# Patient Record
Sex: MALE | Race: WHITE | NOT HISPANIC OR LATINO | URBAN - METROPOLITAN AREA
[De-identification: names, ages, dates, MRNs, and addresses within clinical notes are randomized per-mention and may not be internally consistent; named-entity substitution may affect disease eponyms.]

---

## 2019-02-01 ENCOUNTER — OUTPATIENT (OUTPATIENT)
Dept: OUTPATIENT SERVICES | Facility: HOSPITAL | Age: 19
LOS: 1 days | End: 2019-02-01
Payer: MEDICAID

## 2019-02-01 PROCEDURE — G9001: CPT

## 2019-02-13 ENCOUNTER — INPATIENT (INPATIENT)
Facility: HOSPITAL | Age: 19
LOS: 7 days | Discharge: HOME | End: 2019-02-21
Attending: PSYCHIATRY & NEUROLOGY | Admitting: PSYCHIATRY & NEUROLOGY
Payer: MEDICAID

## 2019-02-13 VITALS — TEMPERATURE: 98 F | HEIGHT: 68 IN | WEIGHT: 171.96 LBS

## 2019-02-13 DIAGNOSIS — F20.9 SCHIZOPHRENIA, UNSPECIFIED: ICD-10-CM

## 2019-02-14 DIAGNOSIS — Z71.89 OTHER SPECIFIED COUNSELING: ICD-10-CM

## 2019-02-14 LAB
ALBUMIN SERPL ELPH-MCNC: 4.9 G/DL — SIGNIFICANT CHANGE UP (ref 3.3–5)
ALP SERPL-CCNC: 104 U/L — SIGNIFICANT CHANGE UP (ref 60–270)
ALT FLD-CCNC: 33 U/L — SIGNIFICANT CHANGE UP (ref 4–41)
AMPHET UR-MCNC: NEGATIVE — SIGNIFICANT CHANGE UP
ANION GAP SERPL CALC-SCNC: 22 MMO/L — HIGH (ref 7–14)
APPEARANCE UR: CLEAR — SIGNIFICANT CHANGE UP
AST SERPL-CCNC: 31 U/L — SIGNIFICANT CHANGE UP (ref 4–40)
BARBITURATES UR SCN-MCNC: NEGATIVE — SIGNIFICANT CHANGE UP
BASOPHILS # BLD AUTO: 0.03 K/UL — SIGNIFICANT CHANGE UP (ref 0–0.2)
BASOPHILS NFR BLD AUTO: 0.3 % — SIGNIFICANT CHANGE UP (ref 0–2)
BENZODIAZ UR-MCNC: NEGATIVE — SIGNIFICANT CHANGE UP
BILIRUB SERPL-MCNC: 0.6 MG/DL — SIGNIFICANT CHANGE UP (ref 0.2–1.2)
BILIRUB UR-MCNC: NEGATIVE — SIGNIFICANT CHANGE UP
BLOOD UR QL VISUAL: NEGATIVE — SIGNIFICANT CHANGE UP
BUN SERPL-MCNC: 10 MG/DL — SIGNIFICANT CHANGE UP (ref 7–23)
CALCIUM SERPL-MCNC: 9.4 MG/DL — SIGNIFICANT CHANGE UP (ref 8.4–10.5)
CANNABINOIDS UR-MCNC: POSITIVE — SIGNIFICANT CHANGE UP
CHLORIDE SERPL-SCNC: 98 MMOL/L — SIGNIFICANT CHANGE UP (ref 98–107)
CHOLEST SERPL-MCNC: 133 MG/DL — SIGNIFICANT CHANGE UP (ref 120–199)
CO2 SERPL-SCNC: 18 MMOL/L — LOW (ref 22–31)
COCAINE METAB.OTHER UR-MCNC: NEGATIVE — SIGNIFICANT CHANGE UP
COLOR SPEC: COLORLESS — SIGNIFICANT CHANGE UP
CREAT SERPL-MCNC: 0.79 MG/DL — SIGNIFICANT CHANGE UP (ref 0.5–1.3)
EOSINOPHIL # BLD AUTO: 0.23 K/UL — SIGNIFICANT CHANGE UP (ref 0–0.5)
EOSINOPHIL NFR BLD AUTO: 2.4 % — SIGNIFICANT CHANGE UP (ref 0–6)
GLUCOSE SERPL-MCNC: 59 MG/DL — LOW (ref 70–99)
GLUCOSE UR-MCNC: NEGATIVE — SIGNIFICANT CHANGE UP
HBA1C BLD-MCNC: 5 % — SIGNIFICANT CHANGE UP (ref 4–5.6)
HCT VFR BLD CALC: 46.4 % — SIGNIFICANT CHANGE UP (ref 39–50)
HDLC SERPL-MCNC: 34 MG/DL — LOW (ref 35–55)
HGB BLD-MCNC: 15.8 G/DL — SIGNIFICANT CHANGE UP (ref 13–17)
IMM GRANULOCYTES NFR BLD AUTO: 0.3 % — SIGNIFICANT CHANGE UP (ref 0–1.5)
KETONES UR-MCNC: SIGNIFICANT CHANGE UP
LEUKOCYTE ESTERASE UR-ACNC: NEGATIVE — SIGNIFICANT CHANGE UP
LIPID PNL WITH DIRECT LDL SERPL: 92 MG/DL — SIGNIFICANT CHANGE UP
LYMPHOCYTES # BLD AUTO: 2 K/UL — SIGNIFICANT CHANGE UP (ref 1–3.3)
LYMPHOCYTES # BLD AUTO: 20.8 % — SIGNIFICANT CHANGE UP (ref 13–44)
MCHC RBC-ENTMCNC: 28.8 PG — SIGNIFICANT CHANGE UP (ref 27–34)
MCHC RBC-ENTMCNC: 34.1 % — SIGNIFICANT CHANGE UP (ref 32–36)
MCV RBC AUTO: 84.7 FL — SIGNIFICANT CHANGE UP (ref 80–100)
METHADONE UR-MCNC: NEGATIVE — SIGNIFICANT CHANGE UP
MONOCYTES # BLD AUTO: 0.78 K/UL — SIGNIFICANT CHANGE UP (ref 0–0.9)
MONOCYTES NFR BLD AUTO: 8.1 % — SIGNIFICANT CHANGE UP (ref 2–14)
NEUTROPHILS # BLD AUTO: 6.54 K/UL — SIGNIFICANT CHANGE UP (ref 1.8–7.4)
NEUTROPHILS NFR BLD AUTO: 68.1 % — SIGNIFICANT CHANGE UP (ref 43–77)
NITRITE UR-MCNC: NEGATIVE — SIGNIFICANT CHANGE UP
NRBC # FLD: 0 K/UL — LOW (ref 25–125)
OPIATES UR-MCNC: NEGATIVE — SIGNIFICANT CHANGE UP
OXYCODONE UR-MCNC: NEGATIVE — SIGNIFICANT CHANGE UP
PCP UR-MCNC: NEGATIVE — SIGNIFICANT CHANGE UP
PH UR: 6.5 — SIGNIFICANT CHANGE UP (ref 5–8)
PLATELET # BLD AUTO: 275 K/UL — SIGNIFICANT CHANGE UP (ref 150–400)
PMV BLD: 9.5 FL — SIGNIFICANT CHANGE UP (ref 7–13)
POTASSIUM SERPL-MCNC: 3.6 MMOL/L — SIGNIFICANT CHANGE UP (ref 3.5–5.3)
POTASSIUM SERPL-SCNC: 3.6 MMOL/L — SIGNIFICANT CHANGE UP (ref 3.5–5.3)
PROT SERPL-MCNC: 7.3 G/DL — SIGNIFICANT CHANGE UP (ref 6–8.3)
PROT UR-MCNC: NEGATIVE — SIGNIFICANT CHANGE UP
RBC # BLD: 5.48 M/UL — SIGNIFICANT CHANGE UP (ref 4.2–5.8)
RBC # FLD: 11.9 % — SIGNIFICANT CHANGE UP (ref 10.3–14.5)
SODIUM SERPL-SCNC: 138 MMOL/L — SIGNIFICANT CHANGE UP (ref 135–145)
SP GR SPEC: 1 — SIGNIFICANT CHANGE UP (ref 1–1.04)
TRIGL SERPL-MCNC: 63 MG/DL — SIGNIFICANT CHANGE UP (ref 10–149)
TSH SERPL-MCNC: 2.92 UIU/ML — SIGNIFICANT CHANGE UP (ref 0.5–4.3)
UROBILINOGEN FLD QL: NORMAL — SIGNIFICANT CHANGE UP
WBC # BLD: 9.61 K/UL — SIGNIFICANT CHANGE UP (ref 3.8–10.5)
WBC # FLD AUTO: 9.61 K/UL — SIGNIFICANT CHANGE UP (ref 3.8–10.5)

## 2019-02-14 PROCEDURE — 99232 SBSQ HOSP IP/OBS MODERATE 35: CPT

## 2019-02-14 RX ORDER — NICOTINE POLACRILEX 2 MG
1 GUM BUCCAL DAILY
Qty: 0 | Refills: 0 | Status: DISCONTINUED | OUTPATIENT
Start: 2019-02-14 | End: 2019-02-21

## 2019-02-14 RX ORDER — ARIPIPRAZOLE 15 MG/1
5 TABLET ORAL DAILY
Qty: 0 | Refills: 0 | Status: DISCONTINUED | OUTPATIENT
Start: 2019-02-14 | End: 2019-02-15

## 2019-02-14 RX ORDER — ARIPIPRAZOLE 15 MG/1
5 TABLET ORAL ONCE
Qty: 0 | Refills: 0 | Status: COMPLETED | OUTPATIENT
Start: 2019-02-14 | End: 2019-02-14

## 2019-02-14 RX ORDER — ACETAMINOPHEN 500 MG
650 TABLET ORAL EVERY 6 HOURS
Qty: 0 | Refills: 0 | Status: DISCONTINUED | OUTPATIENT
Start: 2019-02-14 | End: 2019-02-21

## 2019-02-14 RX ORDER — NICOTINE POLACRILEX 2 MG
1 GUM BUCCAL
Qty: 0 | Refills: 0 | Status: DISCONTINUED | OUTPATIENT
Start: 2019-02-14 | End: 2019-02-21

## 2019-02-14 RX ORDER — NICOTINE POLACRILEX 2 MG
2 GUM BUCCAL
Qty: 0 | Refills: 0 | Status: DISCONTINUED | OUTPATIENT
Start: 2019-02-14 | End: 2019-02-21

## 2019-02-14 RX ADMIN — Medication 2 MILLIGRAM(S): at 01:55

## 2019-02-14 RX ADMIN — Medication 1 PATCH: at 08:00

## 2019-02-14 RX ADMIN — ARIPIPRAZOLE 5 MILLIGRAM(S): 15 TABLET ORAL at 11:37

## 2019-02-14 RX ADMIN — Medication 1 EACH: at 11:52

## 2019-02-14 RX ADMIN — Medication 2 MILLIGRAM(S): at 20:53

## 2019-02-14 RX ADMIN — Medication 2 MILLIGRAM(S): at 13:49

## 2019-02-14 RX ADMIN — Medication 650 MILLIGRAM(S): at 15:30

## 2019-02-14 RX ADMIN — Medication 650 MILLIGRAM(S): at 14:08

## 2019-02-14 RX ADMIN — Medication 1 EACH: at 19:27

## 2019-02-14 NOTE — CHART NOTE - NSCHARTNOTEFT_GEN_A_CORE
Screening Pending. Pt was asleep uncooperative with admission screenings. Will attempt again tonight.

## 2019-02-14 NOTE — CHART NOTE - NSCHARTNOTEFT_GEN_A_CORE
Screening Medical Evaluation  Patient Admitted from: [facility] admitting diagnosis: Schizophrenia    PAST MEDICAL & SURGICAL HISTORY:        Allergies    Omnicef (Hives)    Intolerances        Social History:     FAMILY HISTORY:      MEDICATIONS  (STANDING):  ARIPiprazole 5 milliGRAM(s) Oral daily  nicotine -  14 mG/24Hr(s) Patch 1 patch Transdermal daily    MEDICATIONS  (PRN):  acetaminophen   Tablet .. 650 milliGRAM(s) Oral every 6 hours PRN Moderate Pain (4 - 6)  LORazepam     Tablet 2 milliGRAM(s) Oral every 6 hours PRN Agitation  LORazepam   Injectable 2 milliGRAM(s) IntraMuscular once PRN Agitation  nicotine  Polacrilex Gum 2 milliGRAM(s) Oral every 3 hours PRN nicotine cravings  nicotine - Inhaler 1 Each Inhalation every 2 hours PRN nicotine craving      Vital Signs Last 24 Hrs  T(C): 36.9 (2019 18:41), Max: 36.9 (2019 18:41)  T(F): 98.5 (2019 18:41), Max: 98.5 (2019 18:41)  HR: --117  BP: --113/83  BP(mean): --  RR: --  SpO2: --  CAPILLARY BLOOD GLUCOSE            PHYSICAL EXAM:  GENERAL: NAD, well-developed  HEAD:  Atraumatic, Normocephalic  EYES: EOMI, PERRLA, conjunctiva and sclera clear  NECK: Supple, No JVD  CHEST/LUNG: Clear to auscultation bilaterally; No wheeze  HEART: Regular rate and rhythm; No murmurs, rubs, or gallops  ABDOMEN: Soft, Nontender, Nondistended; Bowel sounds present  EXTREMITIES:  2+ Peripheral Pulses, No clubbing, cyanosis, or edema  PSYCH: AAOx3  NEUROLOGY: non-focal  SKIN: In tact    LABS:                        15.8   9.61  )-----------( 275      ( 2019 08:07 )             46.4     02-14    138  |  98  |  10  ----------------------------<  59<L>  3.6   |  18<L>  |  0.79    Ca    9.4      2019 08:07    TPro  7.3  /  Alb  4.9  /  TBili  0.6  /  DBili  x   /  AST  31  /  ALT  33  /  AlkPhos  104  02-14          Urinalysis Basic - ( 2019 12:25 )    Color: COLORLESS / Appearance: CLEAR / S.005 / pH: 6.5  Gluc: NEGATIVE / Ketone: SMALL  / Bili: NEGATIVE / Urobili: NORMAL   Blood: NEGATIVE / Protein: NEGATIVE / Nitrite: NEGATIVE   Leuk Esterase: NEGATIVE / RBC: x / WBC x   Sq Epi: x / Non Sq Epi: x / Bacteria: x        RADIOLOGY & ADDITIONAL TESTS:    Assessment and Plan: 17 yo M with no significant PMH is admitted to [facility] with a primary psychiatric diagnosis of Schizophrenia. The pt currently denies having any medical complaints such as chest pain, sob, abdominal pain, n/v/d/c, or any problems with urination or bowel movements. The rest of his screening physical is unremarkable.    1.Schizophrenia-Plan: continue with meds as per primary psychiatric team

## 2019-02-15 LAB
CHOLEST SERPL-MCNC: 119 MG/DL — LOW (ref 120–199)
HBA1C BLD-MCNC: 5 % — SIGNIFICANT CHANGE UP (ref 4–5.6)
HDLC SERPL-MCNC: 37 MG/DL — SIGNIFICANT CHANGE UP (ref 35–55)
LIPID PNL WITH DIRECT LDL SERPL: 78 MG/DL — SIGNIFICANT CHANGE UP
TRIGL SERPL-MCNC: 56 MG/DL — SIGNIFICANT CHANGE UP (ref 10–149)

## 2019-02-15 PROCEDURE — 99232 SBSQ HOSP IP/OBS MODERATE 35: CPT | Mod: 25

## 2019-02-15 PROCEDURE — 90853 GROUP PSYCHOTHERAPY: CPT

## 2019-02-15 PROCEDURE — 90832 PSYTX W PT 30 MINUTES: CPT | Mod: 59

## 2019-02-15 RX ORDER — ARIPIPRAZOLE 15 MG/1
10 TABLET ORAL DAILY
Qty: 0 | Refills: 0 | Status: DISCONTINUED | OUTPATIENT
Start: 2019-02-15 | End: 2019-02-19

## 2019-02-15 RX ORDER — HYDROXYZINE HCL 10 MG
50 TABLET ORAL ONCE
Qty: 0 | Refills: 0 | Status: COMPLETED | OUTPATIENT
Start: 2019-02-15 | End: 2019-02-15

## 2019-02-15 RX ADMIN — Medication 650 MILLIGRAM(S): at 02:42

## 2019-02-15 RX ADMIN — Medication 1 EACH: at 08:50

## 2019-02-15 RX ADMIN — Medication 650 MILLIGRAM(S): at 01:42

## 2019-02-15 RX ADMIN — Medication 2 MILLIGRAM(S): at 20:25

## 2019-02-15 RX ADMIN — Medication 650 MILLIGRAM(S): at 20:44

## 2019-02-15 RX ADMIN — Medication 1 PATCH: at 08:26

## 2019-02-15 RX ADMIN — Medication 1 PATCH: at 19:00

## 2019-02-15 RX ADMIN — Medication 650 MILLIGRAM(S): at 21:44

## 2019-02-15 RX ADMIN — Medication 1 EACH: at 04:35

## 2019-02-15 RX ADMIN — Medication 1 EACH: at 11:17

## 2019-02-15 RX ADMIN — Medication 1 PATCH: at 03:26

## 2019-02-15 RX ADMIN — Medication 50 MILLIGRAM(S): at 02:00

## 2019-02-15 RX ADMIN — ARIPIPRAZOLE 5 MILLIGRAM(S): 15 TABLET ORAL at 08:26

## 2019-02-16 PROCEDURE — 99232 SBSQ HOSP IP/OBS MODERATE 35: CPT

## 2019-02-16 RX ORDER — LANOLIN ALCOHOL/MO/W.PET/CERES
3 CREAM (GRAM) TOPICAL ONCE
Qty: 0 | Refills: 0 | Status: COMPLETED | OUTPATIENT
Start: 2019-02-16 | End: 2019-02-17

## 2019-02-16 RX ADMIN — ARIPIPRAZOLE 10 MILLIGRAM(S): 15 TABLET ORAL at 08:41

## 2019-02-16 RX ADMIN — Medication 2 MILLIGRAM(S): at 17:00

## 2019-02-16 RX ADMIN — Medication 650 MILLIGRAM(S): at 08:42

## 2019-02-16 RX ADMIN — Medication 1 EACH: at 08:42

## 2019-02-16 RX ADMIN — Medication 1 PATCH: at 09:32

## 2019-02-16 RX ADMIN — Medication 1 PATCH: at 08:41

## 2019-02-16 RX ADMIN — Medication 2 MILLIGRAM(S): at 20:00

## 2019-02-17 RX ADMIN — Medication 3 MILLIGRAM(S): at 00:06

## 2019-02-17 RX ADMIN — Medication 2 MILLIGRAM(S): at 08:52

## 2019-02-17 RX ADMIN — ARIPIPRAZOLE 10 MILLIGRAM(S): 15 TABLET ORAL at 08:25

## 2019-02-17 RX ADMIN — Medication 2 MILLIGRAM(S): at 21:08

## 2019-02-17 RX ADMIN — Medication 2 MILLIGRAM(S): at 14:23

## 2019-02-17 RX ADMIN — Medication 2 MILLIGRAM(S): at 18:00

## 2019-02-18 PROCEDURE — 99232 SBSQ HOSP IP/OBS MODERATE 35: CPT

## 2019-02-18 RX ORDER — DIPHENHYDRAMINE HCL 50 MG
50 CAPSULE ORAL ONCE
Qty: 0 | Refills: 0 | Status: COMPLETED | OUTPATIENT
Start: 2019-02-18 | End: 2019-02-18

## 2019-02-18 RX ORDER — HALOPERIDOL DECANOATE 100 MG/ML
5 INJECTION INTRAMUSCULAR ONCE
Qty: 0 | Refills: 0 | Status: COMPLETED | OUTPATIENT
Start: 2019-02-18 | End: 2019-02-18

## 2019-02-18 RX ADMIN — Medication 2 MILLIGRAM(S): at 20:46

## 2019-02-18 RX ADMIN — Medication 2 MILLIGRAM(S): at 17:35

## 2019-02-18 RX ADMIN — Medication 2 MILLIGRAM(S): at 03:21

## 2019-02-18 RX ADMIN — HALOPERIDOL DECANOATE 5 MILLIGRAM(S): 100 INJECTION INTRAMUSCULAR at 03:21

## 2019-02-18 RX ADMIN — Medication 1 PATCH: at 12:55

## 2019-02-18 RX ADMIN — ARIPIPRAZOLE 10 MILLIGRAM(S): 15 TABLET ORAL at 08:59

## 2019-02-18 RX ADMIN — Medication 50 MILLIGRAM(S): at 03:21

## 2019-02-18 RX ADMIN — Medication 2 MILLIGRAM(S): at 09:00

## 2019-02-18 RX ADMIN — Medication 2 MILLIGRAM(S): at 03:44

## 2019-02-18 RX ADMIN — Medication 1 EACH: at 03:45

## 2019-02-19 PROCEDURE — 99232 SBSQ HOSP IP/OBS MODERATE 35: CPT

## 2019-02-19 RX ORDER — ARIPIPRAZOLE 15 MG/1
20 TABLET ORAL DAILY
Qty: 0 | Refills: 0 | Status: DISCONTINUED | OUTPATIENT
Start: 2019-02-19 | End: 2019-02-21

## 2019-02-19 RX ADMIN — Medication 2 MILLIGRAM(S): at 03:45

## 2019-02-19 RX ADMIN — Medication 2 MILLIGRAM(S): at 12:48

## 2019-02-19 RX ADMIN — Medication 2 MILLIGRAM(S): at 19:39

## 2019-02-19 RX ADMIN — Medication 1 PATCH: at 07:34

## 2019-02-19 RX ADMIN — ARIPIPRAZOLE 10 MILLIGRAM(S): 15 TABLET ORAL at 07:34

## 2019-02-19 RX ADMIN — Medication 1 EACH: at 07:35

## 2019-02-20 PROCEDURE — 99232 SBSQ HOSP IP/OBS MODERATE 35: CPT

## 2019-02-20 RX ORDER — ARIPIPRAZOLE 15 MG/1
1 TABLET ORAL
Qty: 30 | Refills: 0 | OUTPATIENT
Start: 2019-02-20 | End: 2019-03-21

## 2019-02-20 RX ADMIN — Medication 1 PATCH: at 08:12

## 2019-02-20 RX ADMIN — Medication 1 PATCH: at 08:13

## 2019-02-20 RX ADMIN — Medication 2 MILLIGRAM(S): at 21:36

## 2019-02-20 RX ADMIN — ARIPIPRAZOLE 20 MILLIGRAM(S): 15 TABLET ORAL at 08:12

## 2019-02-20 RX ADMIN — Medication 2 MILLIGRAM(S): at 16:04

## 2019-02-21 VITALS — TEMPERATURE: 96 F

## 2019-02-21 PROCEDURE — 99238 HOSP IP/OBS DSCHRG MGMT 30/<: CPT

## 2019-02-21 RX ADMIN — Medication 1 PATCH: at 08:57

## 2019-02-21 RX ADMIN — ARIPIPRAZOLE 20 MILLIGRAM(S): 15 TABLET ORAL at 08:57

## 2023-05-10 ENCOUNTER — APPOINTMENT (OUTPATIENT)
Dept: OPHTHALMOLOGY | Facility: CLINIC | Age: 23
End: 2023-05-10
Payer: MEDICAID

## 2023-05-10 ENCOUNTER — NON-APPOINTMENT (OUTPATIENT)
Age: 23
End: 2023-05-10

## 2023-05-10 PROCEDURE — 92004 COMPRE OPH EXAM NEW PT 1/>: CPT
